# Patient Record
Sex: FEMALE | Race: WHITE | NOT HISPANIC OR LATINO | URBAN - METROPOLITAN AREA
[De-identification: names, ages, dates, MRNs, and addresses within clinical notes are randomized per-mention and may not be internally consistent; named-entity substitution may affect disease eponyms.]

---

## 2017-01-11 ENCOUNTER — IMPORTED ENCOUNTER (OUTPATIENT)
Dept: URBAN - METROPOLITAN AREA CLINIC 9 | Facility: CLINIC | Age: 57
End: 2017-01-11

## 2017-02-09 ENCOUNTER — IMPORTED ENCOUNTER (OUTPATIENT)
Dept: URBAN - METROPOLITAN AREA CLINIC 9 | Facility: CLINIC | Age: 57
End: 2017-02-09

## 2017-04-12 ENCOUNTER — IMPORTED ENCOUNTER (OUTPATIENT)
Dept: URBAN - METROPOLITAN AREA CLINIC 9 | Facility: CLINIC | Age: 57
End: 2017-04-12

## 2017-07-12 ENCOUNTER — IMPORTED ENCOUNTER (OUTPATIENT)
Dept: URBAN - METROPOLITAN AREA CLINIC 9 | Facility: CLINIC | Age: 57
End: 2017-07-12

## 2017-11-08 ENCOUNTER — IMPORTED ENCOUNTER (OUTPATIENT)
Dept: URBAN - METROPOLITAN AREA CLINIC 9 | Facility: CLINIC | Age: 57
End: 2017-11-08

## 2018-03-21 ENCOUNTER — IMPORTED ENCOUNTER (OUTPATIENT)
Dept: URBAN - METROPOLITAN AREA CLINIC 9 | Facility: CLINIC | Age: 58
End: 2018-03-21

## 2018-09-20 ENCOUNTER — IMPORTED ENCOUNTER (OUTPATIENT)
Dept: URBAN - METROPOLITAN AREA CLINIC 9 | Facility: CLINIC | Age: 58
End: 2018-09-20

## 2019-03-21 ENCOUNTER — IMPORTED ENCOUNTER (OUTPATIENT)
Dept: URBAN - METROPOLITAN AREA CLINIC 9 | Facility: CLINIC | Age: 59
End: 2019-03-21

## 2019-09-18 ENCOUNTER — IMPORTED ENCOUNTER (OUTPATIENT)
Dept: URBAN - METROPOLITAN AREA CLINIC 9 | Facility: CLINIC | Age: 59
End: 2019-09-18

## 2020-03-04 ENCOUNTER — IMPORTED ENCOUNTER (OUTPATIENT)
Dept: URBAN - METROPOLITAN AREA CLINIC 9 | Facility: CLINIC | Age: 60
End: 2020-03-04

## 2020-07-13 NOTE — PATIENT DISCUSSION
The patient was informed that with the Basic option, they will most likely need prescription glasses at all focal points after surgery. The patient elects Basic OD, goal of -2.00.

## 2020-07-13 NOTE — PATIENT DISCUSSION
Patient advised of the right to post-operative care by the surgeon. Patient is fully informed of, and agreed to, co-management with their primary optometric physician. Post-operative care by the surgeon is not medically necessary and co-management is clinically appropriate. Patient has received itemization of fees related to cataract surgery. Transfer of care letter completed for the patient. Transfer care of right eye to Dr. Nuria Negro on 7/13/2020. Patient instructed to call immediately if any new distortion, blurring, decreased vision or eye pain.

## 2020-07-20 NOTE — PATIENT DISCUSSION
Patient advised of the right to post-operative care by the surgeon. Patient is fully informed of, and agreed to, co-management with their primary optometric physician. Post-operative care by the surgeon is not medically necessary and co-management is clinically appropriate. Patient has received itemization of fees related to cataract surgery. Transfer of care letter completed for the patient. Transfer care of the Left eye to Dr. Eliza Cruz on 07/20/2020. Patient instructed to call immediately if any new distortion, blurring, decreased vision or eye pain.

## 2020-09-16 ENCOUNTER — IMPORTED ENCOUNTER (OUTPATIENT)
Dept: URBAN - METROPOLITAN AREA CLINIC 9 | Facility: CLINIC | Age: 60
End: 2020-09-16

## 2021-03-03 ENCOUNTER — IMPORTED ENCOUNTER (OUTPATIENT)
Dept: URBAN - METROPOLITAN AREA CLINIC 9 | Facility: CLINIC | Age: 61
End: 2021-03-03

## 2021-09-15 ENCOUNTER — IMPORTED ENCOUNTER (OUTPATIENT)
Dept: URBAN - METROPOLITAN AREA CLINIC 9 | Facility: CLINIC | Age: 61
End: 2021-09-15

## 2021-09-15 PROBLEM — H25.13: Noted: 2021-09-15

## 2021-09-15 PROBLEM — E11.3393: Noted: 2021-09-15

## 2021-09-15 PROBLEM — H35.051: Noted: 2021-09-15

## 2021-10-17 ASSESSMENT — VISUAL ACUITY
OS_SC: 20/50 -2 SN
OD_SC: 20/50 +2 SN
OS_CC: 20/50 SN
OD_SC: 20/60 + SN
OD_CC: 20/50 SN
OS_CC: 20/30 SN
OS_SC: 20/50 + SN
OD_SC: 20/60 SN
OD_CC: 20/50 SN
OS_SC: 20/40 - SN
OS_CC: 20/25 SN
OS_SC: 20/30 - SN
OD_SC: 20/60 SN
OD_CC: 20/60 SN
OS_PH: 20/40 SN
OD_PH: 20/50 -2 SN
OS_CC: 20/25 -2 SN
OS_SC: 20/40 - SN
OD_PH: 20/50 +2 SN
OS_PH: 20/30 - SN
OD_SC: 20/50 + SN
OD_SC: 20/80 SN
OD_CC: 20/40 SN
OS_PH: 20/40 -2 SN
OD_PH: 20/50 + SN
OD_SC: 20/60 SN
OS_SC: 20/40 -2 SN
OS_SC: 20/30 -2 SN
OD_PH: 20/60 SN

## 2021-10-17 ASSESSMENT — TONOMETRY
OS_IOP_MMHG: 15
OS_IOP_MMHG: 16
OD_IOP_MMHG: 17
OS_IOP_MMHG: 14
OD_IOP_MMHG: 18
OS_IOP_MMHG: 12
OD_IOP_MMHG: 13
OD_IOP_MMHG: 22
OD_IOP_MMHG: 18
OD_IOP_MMHG: 14
OD_IOP_MMHG: 14
OS_IOP_MMHG: 14
OD_IOP_MMHG: 20
OS_IOP_MMHG: 15
OS_IOP_MMHG: 12
OS_IOP_MMHG: 14
OS_IOP_MMHG: 17
OS_IOP_MMHG: 15
OS_IOP_MMHG: 16
OD_IOP_MMHG: 14
OD_IOP_MMHG: 12
OD_IOP_MMHG: 18
OS_IOP_MMHG: 14
OD_IOP_MMHG: 17
OS_IOP_MMHG: 17
OD_IOP_MMHG: 13

## 2022-03-02 ENCOUNTER — ESTABLISHED PATIENT (OUTPATIENT)
Dept: URBAN - METROPOLITAN AREA CLINIC 4 | Facility: CLINIC | Age: 62
End: 2022-03-02

## 2022-03-02 DIAGNOSIS — E11.3393: ICD-10-CM

## 2022-03-02 DIAGNOSIS — H35.051: ICD-10-CM

## 2022-03-02 PROCEDURE — 92014 COMPRE OPH EXAM EST PT 1/>: CPT

## 2022-03-02 PROCEDURE — 92134 CPTRZ OPH DX IMG PST SGM RTA: CPT

## 2022-03-07 ASSESSMENT — VISUAL ACUITY
OD_CC: 20/70+1
OS_CC: 20/60+1

## 2022-03-07 ASSESSMENT — TONOMETRY
OD_IOP_MMHG: 15
OS_IOP_MMHG: 17

## 2022-07-05 RX ORDER — SUMATRIPTAN 20 MG/1
SPRAY NASAL
COMMUNITY

## 2022-07-05 RX ORDER — METHYLPHENIDATE HYDROCHLORIDE 27 MG/1
TABLET ORAL
COMMUNITY

## 2022-07-05 RX ORDER — LAMOTRIGINE 200 MG/1
TABLET ORAL
COMMUNITY

## 2022-07-05 RX ORDER — LEVOTHYROXINE SODIUM 0.05 MG/1
TABLET ORAL
COMMUNITY
End: 2022-08-11

## 2022-07-05 RX ORDER — PRAVASTATIN SODIUM 40 MG
TABLET ORAL
COMMUNITY

## 2022-07-05 RX ORDER — TEMAZEPAM 15 MG/1
30 CAPSULE ORAL
COMMUNITY

## 2022-09-01 PROBLEM — E11.9 TYPE 2 DIABETES MELLITUS WITHOUT COMPLICATION (HCC): Status: ACTIVE | Noted: 2022-09-01

## 2022-09-01 PROBLEM — S43.422A SPRAIN OF LEFT ROTATOR CUFF CAPSULE: Status: ACTIVE | Noted: 2022-09-01

## 2022-09-01 PROBLEM — F31.9 BIPOLAR I DISORDER (HCC): Status: ACTIVE | Noted: 2022-09-01

## 2022-09-01 PROBLEM — N95.1 MENOPAUSAL SYMPTOM: Status: ACTIVE | Noted: 2022-09-01

## 2022-09-01 PROBLEM — E78.00 HYPERCHOLESTEROLEMIA: Status: ACTIVE | Noted: 2022-09-01

## 2022-09-01 PROBLEM — M54.16 LUMBAR RADICULOPATHY: Status: ACTIVE | Noted: 2022-03-01

## 2022-09-01 PROBLEM — S62.654A CLOSED NONDISPLACED FRACTURE OF MIDDLE PHALANX OF RIGHT RING FINGER: Status: ACTIVE | Noted: 2022-09-01

## 2022-09-01 PROBLEM — G25.81 RESTLESS LEGS: Status: ACTIVE | Noted: 2022-09-01

## 2022-09-01 PROBLEM — M76.51 PATELLAR TENDINITIS OF RIGHT KNEE: Status: ACTIVE | Noted: 2022-09-01

## 2022-09-01 PROBLEM — K21.9 GASTROESOPHAGEAL REFLUX DISEASE: Status: ACTIVE | Noted: 2022-09-01

## 2022-09-01 PROBLEM — N39.0 URINARY TRACT INFECTION: Status: ACTIVE | Noted: 2022-09-01

## 2022-09-01 PROBLEM — G47.00 INSOMNIA: Status: ACTIVE | Noted: 2022-09-01

## 2022-09-01 PROBLEM — M81.0 OSTEOPOROSIS: Status: ACTIVE | Noted: 2022-09-01

## 2022-09-01 PROBLEM — S83.289A TEAR OF LATERAL MENISCUS OF KNEE: Status: ACTIVE | Noted: 2022-09-01

## 2022-09-01 PROBLEM — M85.80 OSTEOPENIA: Status: ACTIVE | Noted: 2022-09-01

## 2022-09-01 PROBLEM — M54.50 LOW BACK PAIN: Status: ACTIVE | Noted: 2022-01-30

## 2022-09-01 PROBLEM — G43.109 MIGRAINE WITH AURA: Status: ACTIVE | Noted: 2022-09-01

## 2022-09-01 PROBLEM — D48.1 NEOPLASM OF UNCERTAIN BEHAVIOR OF SOFT TISSUE: Status: ACTIVE | Noted: 2022-09-01

## 2022-09-01 PROBLEM — K21.00 REFLUX ESOPHAGITIS: Status: ACTIVE | Noted: 2022-09-01

## 2022-09-01 PROBLEM — M51.36 DEGENERATION OF LUMBAR INTERVERTEBRAL DISC: Status: ACTIVE | Noted: 2018-02-28

## 2022-09-01 PROBLEM — S52.131D: Status: ACTIVE | Noted: 2022-09-01

## 2022-09-01 PROBLEM — M54.17 LUMBOSACRAL RADICULITIS: Status: ACTIVE | Noted: 2022-06-01

## 2022-09-01 PROBLEM — E03.9 HYPOTHYROIDISM: Status: ACTIVE | Noted: 2022-09-01

## 2022-09-01 PROBLEM — S62.654D: Status: ACTIVE | Noted: 2022-09-01

## 2022-09-12 ENCOUNTER — ESTABLISHED PATIENT (OUTPATIENT)
Dept: URBAN - METROPOLITAN AREA CLINIC 4 | Facility: CLINIC | Age: 62
End: 2022-09-12

## 2022-09-12 DIAGNOSIS — E11.3393: ICD-10-CM

## 2022-09-12 DIAGNOSIS — H35.051: ICD-10-CM

## 2022-09-12 PROCEDURE — 92014 COMPRE OPH EXAM EST PT 1/>: CPT

## 2022-09-12 PROCEDURE — 92134 CPTRZ OPH DX IMG PST SGM RTA: CPT

## 2022-09-12 ASSESSMENT — VISUAL ACUITY
OD_CC: 20/50-2
OS_CC: 20/20-1

## 2022-09-12 ASSESSMENT — TONOMETRY
OD_IOP_MMHG: 11
OS_IOP_MMHG: 11

## 2022-10-01 PROBLEM — N39.0 URINARY TRACT INFECTION: Status: RESOLVED | Noted: 2022-09-01 | Resolved: 2022-10-01

## 2022-10-20 PROBLEM — S83.271A COMPLEX TEAR OF LATERAL MENISCUS OF RIGHT KNEE AS CURRENT INJURY: Status: ACTIVE | Noted: 2022-10-20

## 2022-10-20 PROBLEM — M94.261 CHONDROMALACIA, RIGHT KNEE: Status: ACTIVE | Noted: 2022-10-20

## 2022-10-20 PROBLEM — S80.01XA CONTUSION OF RIGHT KNEE: Status: ACTIVE | Noted: 2022-10-20
